# Patient Record
Sex: MALE | HISPANIC OR LATINO
[De-identification: names, ages, dates, MRNs, and addresses within clinical notes are randomized per-mention and may not be internally consistent; named-entity substitution may affect disease eponyms.]

---

## 2017-02-25 ENCOUNTER — HOSPITAL (OUTPATIENT)
Dept: OTHER | Age: 62
End: 2017-02-25

## 2017-02-25 LAB — GLUCOSE BLDC GLUCOMTR-MCNC: 159 MG/DL (ref 65–99)

## 2017-02-26 ENCOUNTER — CHARTING TRANS (OUTPATIENT)
Dept: OTHER | Age: 62
End: 2017-02-26

## 2017-03-01 RX ORDER — INSULIN GLARGINE 100 [IU]/ML
INJECTION, SOLUTION SUBCUTANEOUS
Qty: 15 PEN | Refills: 0 | Status: SHIPPED | OUTPATIENT
Start: 2017-03-01

## 2017-03-01 RX ORDER — ATORVASTATIN CALCIUM 10 MG/1
TABLET, FILM COATED ORAL
Qty: 30 TABLET | Refills: 0 | Status: SHIPPED | OUTPATIENT
Start: 2017-03-01

## 2017-06-01 DIAGNOSIS — E11.9 DIABETES MELLITUS TYPE 2 IN NONOBESE (HCC): Primary | ICD-10-CM

## 2017-06-01 RX ORDER — ATORVASTATIN CALCIUM 10 MG/1
TABLET, FILM COATED ORAL
Qty: 30 TABLET | Refills: 0 | Status: SHIPPED | OUTPATIENT
Start: 2017-06-01

## 2017-06-02 NOTE — TELEPHONE ENCOUNTER
JAYRO, please schedule a follow up appointment for patient with Dr. Naomy Brown. Patient is to complete blood test prior to appointment. Order is in the system.

## 2017-06-05 NOTE — TELEPHONE ENCOUNTER
Called pt and stated his insurance does not cover Dr. [address], Oren Davalos states he is now seeing Kandy Diop.

## 2017-06-10 NOTE — TELEPHONE ENCOUNTER
CSS please assist patient to make follow up appt and may transfer back to triage   Cholesterol Medications: Refill protocol failed because the patient did not meet the protocol criteria.  Please advise in regards to refill request     Protocol Criteria:  ·

## 2017-06-10 NOTE — TELEPHONE ENCOUNTER
Called pt and stated his insurance does not cover Dr. Norma Myers, [address] states he is now seeing Roz Johnson.    Please see previous encounter 6/1.

## 2017-06-19 RX ORDER — ATORVASTATIN CALCIUM 10 MG/1
TABLET, FILM COATED ORAL
Qty: 30 TABLET | Refills: 0 | OUTPATIENT
Start: 2017-06-19

## 2021-03-08 DIAGNOSIS — Z23 NEED FOR VACCINATION: ICD-10-CM

## 2022-12-22 ENCOUNTER — PATIENT OUTREACH (OUTPATIENT)
Dept: CASE MANAGEMENT | Age: 67
End: 2022-12-22

## 2022-12-22 NOTE — PROCEDURES
The office order for PCP request is Approved and completed on December 22, 2022.     Thanks,  [facility] [employer]